# Patient Record
Sex: FEMALE | Race: WHITE | NOT HISPANIC OR LATINO | Employment: OTHER | URBAN - METROPOLITAN AREA
[De-identification: names, ages, dates, MRNs, and addresses within clinical notes are randomized per-mention and may not be internally consistent; named-entity substitution may affect disease eponyms.]

---

## 2022-09-13 ENCOUNTER — HOSPITAL ENCOUNTER (EMERGENCY)
Facility: CLINIC | Age: 67
Discharge: HOME | End: 2022-09-14
Attending: STUDENT IN AN ORGANIZED HEALTH CARE EDUCATION/TRAINING PROGRAM | Admitting: STUDENT IN AN ORGANIZED HEALTH CARE EDUCATION/TRAINING PROGRAM
Payer: COMMERCIAL

## 2022-09-13 ENCOUNTER — APPOINTMENT (OUTPATIENT)
Dept: MRI IMAGING | Facility: CLINIC | Age: 67
End: 2022-09-13
Attending: STUDENT IN AN ORGANIZED HEALTH CARE EDUCATION/TRAINING PROGRAM
Payer: COMMERCIAL

## 2022-09-13 DIAGNOSIS — G45.4 TGA (TRANSIENT GLOBAL AMNESIA): ICD-10-CM

## 2022-09-13 LAB
ALBUMIN SERPL-MCNC: 3.7 G/DL (ref 3.5–5)
ALBUMIN UR-MCNC: NEGATIVE MG/DL
ALP SERPL-CCNC: 51 U/L (ref 45–120)
ALT SERPL W P-5'-P-CCNC: 28 U/L (ref 0–45)
AMPHETAMINES UR QL SCN: NORMAL
ANION GAP SERPL CALCULATED.3IONS-SCNC: 11 MMOL/L (ref 5–18)
APPEARANCE UR: CLEAR
AST SERPL W P-5'-P-CCNC: 24 U/L (ref 0–40)
BARBITURATES UR QL: NORMAL
BASOPHILS # BLD AUTO: 0 10E3/UL (ref 0–0.2)
BASOPHILS NFR BLD AUTO: 0 %
BENZODIAZ UR QL: NORMAL
BILIRUB SERPL-MCNC: 0.3 MG/DL (ref 0–1)
BILIRUB UR QL STRIP: NEGATIVE
BUN SERPL-MCNC: 12 MG/DL (ref 8–22)
CALCIUM SERPL-MCNC: 9.2 MG/DL (ref 8.5–10.5)
CANNABINOIDS UR QL SCN: NORMAL
CHLORIDE BLD-SCNC: 103 MMOL/L (ref 98–107)
CO2 SERPL-SCNC: 26 MMOL/L (ref 22–31)
COCAINE UR QL: NORMAL
COLOR UR AUTO: COLORLESS
CREAT SERPL-MCNC: 0.74 MG/DL (ref 0.6–1.1)
CREAT UR-MCNC: 32 MG/DL
EOSINOPHIL # BLD AUTO: 0 10E3/UL (ref 0–0.7)
EOSINOPHIL NFR BLD AUTO: 1 %
ERYTHROCYTE [DISTWIDTH] IN BLOOD BY AUTOMATED COUNT: 12.8 % (ref 10–15)
ETHANOL SERPL-MCNC: <10 MG/DL
GFR SERPL CREATININE-BSD FRML MDRD: 88 ML/MIN/1.73M2
GLUCOSE BLD-MCNC: 91 MG/DL (ref 70–125)
GLUCOSE UR STRIP-MCNC: NEGATIVE MG/DL
HCT VFR BLD AUTO: 36.4 % (ref 35–47)
HGB BLD-MCNC: 12.5 G/DL (ref 11.7–15.7)
HGB UR QL STRIP: NEGATIVE
IMM GRANULOCYTES # BLD: 0 10E3/UL
IMM GRANULOCYTES NFR BLD: 0 %
KETONES UR STRIP-MCNC: NEGATIVE MG/DL
LEUKOCYTE ESTERASE UR QL STRIP: NEGATIVE
LYMPHOCYTES # BLD AUTO: 2.1 10E3/UL (ref 0.8–5.3)
LYMPHOCYTES NFR BLD AUTO: 33 %
MCH RBC QN AUTO: 31.2 PG (ref 26.5–33)
MCHC RBC AUTO-ENTMCNC: 34.3 G/DL (ref 31.5–36.5)
MCV RBC AUTO: 91 FL (ref 78–100)
METHADONE UR QL SCN: NORMAL
MONOCYTES # BLD AUTO: 0.5 10E3/UL (ref 0–1.3)
MONOCYTES NFR BLD AUTO: 8 %
NEUTROPHILS # BLD AUTO: 3.8 10E3/UL (ref 1.6–8.3)
NEUTROPHILS NFR BLD AUTO: 58 %
NITRATE UR QL: NEGATIVE
NRBC # BLD AUTO: 0 10E3/UL
NRBC BLD AUTO-RTO: 0 /100
OPIATES UR QL SCN: NORMAL
OXYCODONE UR QL: NORMAL
PCP UR QL SCN: NORMAL
PH UR STRIP: 7 [PH] (ref 5–7)
PLATELET # BLD AUTO: 285 10E3/UL (ref 150–450)
POTASSIUM BLD-SCNC: 4.1 MMOL/L (ref 3.5–5)
PROT SERPL-MCNC: 6.7 G/DL (ref 6–8)
RBC # BLD AUTO: 4.01 10E6/UL (ref 3.8–5.2)
RBC URINE: 0 /HPF
SODIUM SERPL-SCNC: 140 MMOL/L (ref 136–145)
SP GR UR STRIP: 1.01 (ref 1–1.03)
SQUAMOUS EPITHELIAL: <1 /HPF
UROBILINOGEN UR STRIP-MCNC: <2 MG/DL
WBC # BLD AUTO: 6.5 10E3/UL (ref 4–11)
WBC URINE: <1 /HPF

## 2022-09-13 PROCEDURE — 250N000013 HC RX MED GY IP 250 OP 250 PS 637: Performed by: STUDENT IN AN ORGANIZED HEALTH CARE EDUCATION/TRAINING PROGRAM

## 2022-09-13 PROCEDURE — 85004 AUTOMATED DIFF WBC COUNT: CPT | Performed by: PHYSICIAN ASSISTANT

## 2022-09-13 PROCEDURE — 80307 DRUG TEST PRSMV CHEM ANLYZR: CPT | Performed by: PHYSICIAN ASSISTANT

## 2022-09-13 PROCEDURE — 81001 URINALYSIS AUTO W/SCOPE: CPT | Mod: XS | Performed by: PHYSICIAN ASSISTANT

## 2022-09-13 PROCEDURE — 70553 MRI BRAIN STEM W/O & W/DYE: CPT

## 2022-09-13 PROCEDURE — 82077 ASSAY SPEC XCP UR&BREATH IA: CPT | Performed by: PHYSICIAN ASSISTANT

## 2022-09-13 PROCEDURE — 36415 COLL VENOUS BLD VENIPUNCTURE: CPT | Performed by: PHYSICIAN ASSISTANT

## 2022-09-13 PROCEDURE — 80053 COMPREHEN METABOLIC PANEL: CPT | Performed by: PHYSICIAN ASSISTANT

## 2022-09-13 PROCEDURE — 99285 EMERGENCY DEPT VISIT HI MDM: CPT | Mod: 25

## 2022-09-13 RX ORDER — ACETAMINOPHEN 325 MG/1
975 TABLET ORAL ONCE
Status: COMPLETED | OUTPATIENT
Start: 2022-09-13 | End: 2022-09-13

## 2022-09-13 RX ADMIN — ACETAMINOPHEN 975 MG: 325 TABLET, FILM COATED ORAL at 20:12

## 2022-09-13 ASSESSMENT — ACTIVITIES OF DAILY LIVING (ADL)
ADLS_ACUITY_SCORE: 35

## 2022-09-13 NOTE — ED TRIAGE NOTES
"Pt presents to the ED with c/o memory loss today around  this morning. Pt does not recall any events and some how \"ended up at work\". Since sx resolved and pt is alert and orientated x4. Pt hx of head bleed. Pt currently having HA. Provider in triage - no stroke code at this time.       "

## 2022-09-13 NOTE — PROGRESS NOTES
Called by ED about this patient with a episode of loss of time for 4 hours today.  Now back to normal.  No witness during time but she was able to drive.  Normal neuro exam per report.      Without witness when symptomatic it is tough to say defintively but if was able to drive suspect was TGA.   Since cannot say more definitively would get MRI brain, however do not think needs admission unless MRI abnormal if normal exam.  Would also consider drug screen.  Would consider neurology referral for EEG but this is non urgent and can be done on outpatient basis if at baseline.      Liberty franco, DO

## 2022-09-13 NOTE — ED PROVIDER NOTES
Emergency Department Encounter         FINAL IMPRESSION:  TGA        ED COURSE AND MEDICAL DECISION MAKING       ED Course as of 09/13/22 2145   Tue Sep 13, 2022   [id number] Patient is a 67-year-old female with a history of orthopedic pain, no other chronic medical problems, here from home with a transient episode of loss of memory.  States that she woke up this morning feeling good.  Worked out without difficulty.  When she came back from working out there now is a blank space of memory from about 8 AM to about noon.  States that she went to work at some point and does not know how she got there.  Family stated that they found her purse in the road and they think that she most likely put her purse on top of her car and started driving.  Patient on arrival here is neurologically intact.  Is alert and oriented x4.  NIH of 0.   states patient has been doing well.  Does not seem confused.  Suspicion for TGA.  Physical examination otherwise unremarkable.  Plan for basic labs MRI of the brain with and without contrast and discussion with neurology.   2008 Discussed case with on-call neurology who agrees that patient's presentation is consistent with a TGA episode.  Plan for MRI, basic labs and if normal discharge home with close outpatient follow-up with neurology for most likely outpatient EEG.     -Patient signed out to oncoming physician pending MRI.  If normal, discharged home with close outpatient follow-up.    6:15 PM I met with the patient to gather history and to perform my initial exam. I discussed the plan for care while in the Emergency Department.   6:56 PM Spoke with neurology, Dr. Rivers. They agree with the plan, and recommend non emergent outpatient EEG.  7:16 PM Checked in on and updated patient.     At the conclusion of the encounter I discussed the results of all the tests and the disposition. The questions were answered. The patient or family acknowledged understanding and was agreeable with  "the care plan.          MEDICATIONS GIVEN IN THE EMERGENCY DEPARTMENT:  Medications - No data to display    NEW PRESCRIPTIONS STARTED AT TODAY'S ED VISIT:  New Prescriptions    No medications on file       HPI     Patient information obtained from: Patient    Use of Interpretor: N/A     Salome Isaacs is a 67 year old female with no pertinent history on file who presents to this ED by walk-in for evaluation of memory loss.     Patient states she woke up this morning feeling great which was unusual for her due to many medical problems. She went to workout at 6:00 AM, and finished around 8:00 AM. The next thing patient remembered was being at work, and not knowing how she got there. This was around 12:00 PM. Patient denies knowing what happened to her during her memory loss. Patient denies any new pain, difficulty walking, or any other complaints at this time. Patient's  states they found her purse in the road, and assume she placed it on the car and drove off. He also states her son called her and thought she sounded \"out of sorts\". Endorses she is currently back to normal and is not currently confused. He notes she has had brain surgery twice.     REVIEW OF SYSTEMS:  Review of Systems   Constitutional: Negative for fever, malaise  HEENT: Negative runny nose, sore throat, ear pain, neck pain  Respiratory: Negative for shortness of breath, cough, congestion  Cardiovascular: Negative for chest pain, leg edema  Gastrointestinal: Negative for abdominal distention, abdominal pain, constipation, vomiting, nausea, diarrhea  Genitourinary: Negative for dysuria and hematuria.   Integument: Negative for rash, skin breakdown  Neurological: Negative for paresthesias, weakness, headache. Positive for memory loss.   Musculoskeletal: Negative for joint pain, joint swelling      All other systems reviewed and are negative.          MEDICAL HISTORY     No past medical history on file.    No past surgical history on " "file.         No current outpatient medications on file.          PHYSICAL EXAM     BP (!) 147/85   Pulse 102   Temp 98.5  F (36.9  C) (Oral)   Resp 18   Ht 1.626 m (5' 4\")   Wt 61.2 kg (135 lb)   SpO2 98%   BMI 23.17 kg/m        PHYSICAL EXAM:     General: Patient appears well, nontoxic, comfortable  HEENT: Moist mucous membranes, no tongue swelling.  No head trauma.  No midline neck pain.  Cardiovascular: Normal rate, normal rhythm, no extremity edema.  No appreciable murmur.  Respiratory: No signs of respiratory distress, lungs are clear to auscultation bilaterally with no wheezes rhonchi or rales.  Abdominal: Soft, nontender, nondistended, no palpable masses, no guarding, no rebound  Musculoskeletal: Full range of motion of joints, no deformities appreciated.  Neurological: Alert and oriented x4, grossly neurologically intact.  [employer] Stroke Scale  Exam Interval: Baseline   Score    Level of consciousness: (0)   Alert, keenly responsive    LOC questions: (0)   Answers both questions correctly    LOC commands: (0)   Performs both tasks correctly    Best gaze: (0)   Normal    Visual: (0)   No visual loss    Facial palsy: (0)   Normal symmetrical movements    Motor arm (left): (0)   No drift    Motor arm (right): (0)   No drift    Motor leg (left): (0)   No drift    Motor leg (right): (0)   No drift    Limb ataxia: (0)   Absent    Sensory: (0)   Normal- no sensory loss    Best language: (0)   Normal- no aphasia    Dysarthria: (0)   Normal    Extinction and inattention: (0)   No abnormality        Total Score:  0   Psychological: Normal affect and mood.  Integument: No rashes appreciated      RESULTS       Labs Ordered and Resulted from Time of ED Arrival to Time of ED Departure   ROUTINE UA WITH MICROSCOPIC REFLEX TO CULTURE - Normal       Result Value    Color Urine Colorless      Appearance Urine Clear      Glucose Urine Negative      Bilirubin Urine Negative      Ketones Urine " Negative      Specific Gravity Urine 1.007      Blood Urine Negative      pH Urine 7.0      Protein Albumin Urine Negative      Urobilinogen Urine <2.0      Nitrite Urine Negative      Leukocyte Esterase Urine Negative      RBC Urine 0      WBC Urine <1      Squamous Epithelials Urine <1     ETHYL ALCOHOL LEVEL - Normal    Alcohol, Blood <10     COMPREHENSIVE METABOLIC PANEL - Normal    Sodium 140      Potassium 4.1      Chloride 103      Carbon Dioxide (CO2) 26      Anion Gap 11      Urea Nitrogen 12      Creatinine 0.74      Calcium 9.2      Glucose 91      Alkaline Phosphatase 51      AST 24      ALT 28      Protein Total 6.7      Albumin 3.7      Bilirubin Total 0.3      GFR Estimate 88     CBC WITH PLATELETS AND DIFFERENTIAL    WBC Count 6.5      RBC Count 4.01      Hemoglobin 12.5      Hematocrit 36.4      MCV 91      MCH 31.2      MCHC 34.3      RDW 12.8      Platelet Count 285      % Neutrophils 58      % Lymphocytes 33      % Monocytes 8      % Eosinophils 1      % Basophils 0      % Immature Granulocytes 0      NRBCs per 100 WBC 0      Absolute Neutrophils 3.8      Absolute Lymphocytes 2.1      Absolute Monocytes 0.5      Absolute Eosinophils 0.0      Absolute Basophils 0.0      Absolute Immature Granulocytes 0.0      Absolute NRBCs 0.0     DRUGS OF ABUSE 1+ PANEL, URINE ([facility] ONLY)    Amphetamines Urine Screen Negative      Benzodiazepines Urine Screen Negative      Opiates Urine Screen Negative      PCP Urine Screen Negative      Cannabinoids Urine Screen Negative      Barbiturates Urine Screen Negative      Cocaine Urine Screen Negative      Methadone Urine Screen Negative      Oxycodone Urine Screen Negative      Creatinine Urine mg/dL 32         MR Brain w/o & w Contrast    (Results Pending)             PROCEDURES:  Procedures:  Procedures       I, Libra Badillo am serving as a scribe to document services personally performed by Luis Eduardo Orozco DO, based on my observations and the provider's statements  to me.  I, Luis Eduardo Orozco DO, attest that Libra Badillo is acting in a scribe capacity, has observed my performance of the services and has documented them in accordance with my direction.    Luis Eduardo Orozco DO  Emergency Medicine  [facility] EMERGENCY ROOM     Ernesto, Luis Eduardo Diamond DO  09/13/22 5786

## 2022-09-14 VITALS
SYSTOLIC BLOOD PRESSURE: 159 MMHG | HEIGHT: 64 IN | HEART RATE: 77 BPM | DIASTOLIC BLOOD PRESSURE: 71 MMHG | RESPIRATION RATE: 18 BRPM | OXYGEN SATURATION: 96 % | TEMPERATURE: 98.5 F | BODY MASS INDEX: 23.05 KG/M2 | WEIGHT: 135 LBS

## 2022-09-14 PROCEDURE — A9585 GADOBUTROL INJECTION: HCPCS | Performed by: EMERGENCY MEDICINE

## 2022-09-14 PROCEDURE — 255N000002 HC RX 255 OP 636: Performed by: EMERGENCY MEDICINE

## 2022-09-14 RX ORDER — GADOBUTROL 604.72 MG/ML
6 INJECTION INTRAVENOUS ONCE
Status: COMPLETED | OUTPATIENT
Start: 2022-09-14 | End: 2022-09-14

## 2022-09-14 RX ADMIN — GADOBUTROL 6 ML: 604.72 INJECTION INTRAVENOUS at 00:25

## 2022-09-14 ASSESSMENT — ACTIVITIES OF DAILY LIVING (ADL): ADLS_ACUITY_SCORE: 35
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
## 2022-09-14 NOTE — ED NOTES
MRI has been completed and read, and final eval done by MD. Hinton for discharge per Dr. Maldonado

## 2022-09-14 NOTE — DISCHARGE INSTRUCTIONS
Your MRI today was normal.  There were no signs of stroke or tumor.    Which you most likely had with something called transient global amnesia.  This is a rare phenomenon where you have retrograde amnesia about certain events.  It can last anywhere from 4 to 24 hours.    Please continue take your home medications.  Follow-up with neurology.  You will need most likely an outpatient EEG to make sure you do not have any seizure activity.  Continue to exercise.  Follow-up with primary care doctor in the next week or so.

## 2022-11-11 ENCOUNTER — HOSPITAL ENCOUNTER (EMERGENCY)
Facility: CLINIC | Age: 67
Discharge: HOME | End: 2022-11-12
Admitting: PHYSICIAN ASSISTANT
Payer: COMMERCIAL

## 2022-11-11 ENCOUNTER — APPOINTMENT (OUTPATIENT)
Dept: CT IMAGING | Facility: CLINIC | Age: 67
End: 2022-11-11
Attending: PHYSICIAN ASSISTANT
Payer: COMMERCIAL

## 2022-11-11 VITALS
OXYGEN SATURATION: 98 % | SYSTOLIC BLOOD PRESSURE: 134 MMHG | HEIGHT: 64 IN | HEART RATE: 89 BPM | RESPIRATION RATE: 16 BRPM | DIASTOLIC BLOOD PRESSURE: 61 MMHG | BODY MASS INDEX: 22.88 KG/M2 | TEMPERATURE: 98 F | WEIGHT: 134 LBS

## 2022-11-11 DIAGNOSIS — S13.4XXA WHIPLASH INJURY TO NECK, INITIAL ENCOUNTER: ICD-10-CM

## 2022-11-11 PROCEDURE — 70450 CT HEAD/BRAIN W/O DYE: CPT

## 2022-11-11 PROCEDURE — 99284 EMERGENCY DEPT VISIT MOD MDM: CPT | Mod: 25

## 2022-11-11 RX ORDER — ACETAMINOPHEN 325 MG/1
975 TABLET ORAL ONCE
Status: DISCONTINUED | OUTPATIENT
Start: 2022-11-11 | End: 2022-11-12 | Stop reason: HOSPADM

## 2022-11-11 ASSESSMENT — ENCOUNTER SYMPTOMS: HEADACHES: 1

## 2022-11-11 ASSESSMENT — ACTIVITIES OF DAILY LIVING (ADL)
ADLS_ACUITY_SCORE: 33
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
## 2023-10-03 ENCOUNTER — APPOINTMENT (OUTPATIENT)
Dept: ULTRASOUND IMAGING | Facility: CLINIC | Age: 68
End: 2023-10-03
Attending: EMERGENCY MEDICINE
Payer: COMMERCIAL

## 2023-10-03 ENCOUNTER — APPOINTMENT (OUTPATIENT)
Dept: RADIOLOGY | Facility: CLINIC | Age: 68
End: 2023-10-03
Attending: EMERGENCY MEDICINE
Payer: COMMERCIAL

## 2023-10-03 ENCOUNTER — HOSPITAL ENCOUNTER (EMERGENCY)
Facility: CLINIC | Age: 68
Discharge: HOME | End: 2023-10-03
Attending: EMERGENCY MEDICINE | Admitting: EMERGENCY MEDICINE
Payer: COMMERCIAL

## 2023-10-03 VITALS
TEMPERATURE: 97.4 F | SYSTOLIC BLOOD PRESSURE: 130 MMHG | DIASTOLIC BLOOD PRESSURE: 63 MMHG | RESPIRATION RATE: 30 BRPM | HEART RATE: 85 BPM | OXYGEN SATURATION: 98 % | BODY MASS INDEX: 23 KG/M2 | WEIGHT: 134 LBS

## 2023-10-03 DIAGNOSIS — R07.89 ATYPICAL CHEST PAIN: ICD-10-CM

## 2023-10-03 DIAGNOSIS — M25.00 HEMARTHROSIS: ICD-10-CM

## 2023-10-03 LAB
ANION GAP SERPL CALCULATED.3IONS-SCNC: 12 MMOL/L (ref 7–15)
APPEARANCE FLD: ABNORMAL
ATRIAL RATE - MUSE: 108 BPM
BUN SERPL-MCNC: 20.8 MG/DL (ref 8–23)
CALCIUM SERPL-MCNC: 9 MG/DL (ref 8.8–10.2)
CELL COUNT BODY FLUID SOURCE: ABNORMAL
CHLORIDE SERPL-SCNC: 97 MMOL/L (ref 98–107)
COLOR FLD: ABNORMAL
CREAT SERPL-MCNC: 0.86 MG/DL (ref 0.51–0.95)
CRYSTALS SNV MICRO: NORMAL
DEPRECATED HCO3 PLAS-SCNC: 23 MMOL/L (ref 22–29)
DIASTOLIC BLOOD PRESSURE - MUSE: NORMAL MMHG
EGFRCR SERPLBLD CKD-EPI 2021: 73 ML/MIN/1.73M2
ERYTHROCYTE [DISTWIDTH] IN BLOOD BY AUTOMATED COUNT: 13.2 % (ref 10–15)
GLUCOSE SERPL-MCNC: 106 MG/DL (ref 70–99)
GRAM STAIN RESULT: NORMAL
GRAM STAIN RESULT: NORMAL
HCT VFR BLD AUTO: 34.1 % (ref 35–47)
HGB BLD-MCNC: 12 G/DL (ref 11.7–15.7)
INTERPRETATION ECG - MUSE: NORMAL
LYMPHOCYTES NFR FLD MANUAL: 15 %
MCH RBC QN AUTO: 31.9 PG (ref 26.5–33)
MCHC RBC AUTO-ENTMCNC: 35.2 G/DL (ref 31.5–36.5)
MCV RBC AUTO: 91 FL (ref 78–100)
MONOS+MACROS NFR FLD MANUAL: 76 %
NEUTS BAND NFR FLD MANUAL: 9 %
NT-PROBNP SERPL-MCNC: 44 PG/ML (ref 0–900)
P AXIS - MUSE: -11 DEGREES
PLATELET # BLD AUTO: 186 10E3/UL (ref 150–450)
POTASSIUM SERPL-SCNC: 3.6 MMOL/L (ref 3.4–5.3)
PR INTERVAL - MUSE: 130 MS
QRS DURATION - MUSE: 90 MS
QT - MUSE: 318 MS
QTC - MUSE: 426 MS
R AXIS - MUSE: 10 DEGREES
RBC # BLD AUTO: 3.76 10E6/UL (ref 3.8–5.2)
SODIUM SERPL-SCNC: 132 MMOL/L (ref 135–145)
SYSTOLIC BLOOD PRESSURE - MUSE: NORMAL MMHG
T AXIS - MUSE: 49 DEGREES
TROPONIN T SERPL HS-MCNC: 10 NG/L
VENTRICULAR RATE- MUSE: 108 BPM
WBC # BLD AUTO: 7.4 10E3/UL (ref 4–11)
WBC # FLD AUTO: 1143 /UL

## 2023-10-03 PROCEDURE — 73562 X-RAY EXAM OF KNEE 3: CPT | Mod: LT

## 2023-10-03 PROCEDURE — 85027 COMPLETE CBC AUTOMATED: CPT | Performed by: STUDENT IN AN ORGANIZED HEALTH CARE EDUCATION/TRAINING PROGRAM

## 2023-10-03 PROCEDURE — 93005 ELECTROCARDIOGRAM TRACING: CPT | Performed by: STUDENT IN AN ORGANIZED HEALTH CARE EDUCATION/TRAINING PROGRAM

## 2023-10-03 PROCEDURE — 84484 ASSAY OF TROPONIN QUANT: CPT | Performed by: STUDENT IN AN ORGANIZED HEALTH CARE EDUCATION/TRAINING PROGRAM

## 2023-10-03 PROCEDURE — 89051 BODY FLUID CELL COUNT: CPT | Performed by: STUDENT IN AN ORGANIZED HEALTH CARE EDUCATION/TRAINING PROGRAM

## 2023-10-03 PROCEDURE — 87205 SMEAR GRAM STAIN: CPT | Performed by: STUDENT IN AN ORGANIZED HEALTH CARE EDUCATION/TRAINING PROGRAM

## 2023-10-03 PROCEDURE — 93971 EXTREMITY STUDY: CPT | Mod: LT

## 2023-10-03 PROCEDURE — 87070 CULTURE OTHR SPECIMN AEROBIC: CPT | Performed by: STUDENT IN AN ORGANIZED HEALTH CARE EDUCATION/TRAINING PROGRAM

## 2023-10-03 PROCEDURE — 89060 EXAM SYNOVIAL FLUID CRYSTALS: CPT | Performed by: STUDENT IN AN ORGANIZED HEALTH CARE EDUCATION/TRAINING PROGRAM

## 2023-10-03 PROCEDURE — 99285 EMERGENCY DEPT VISIT HI MDM: CPT | Mod: 25

## 2023-10-03 PROCEDURE — 83880 ASSAY OF NATRIURETIC PEPTIDE: CPT | Performed by: STUDENT IN AN ORGANIZED HEALTH CARE EDUCATION/TRAINING PROGRAM

## 2023-10-03 PROCEDURE — 80048 BASIC METABOLIC PNL TOTAL CA: CPT | Performed by: STUDENT IN AN ORGANIZED HEALTH CARE EDUCATION/TRAINING PROGRAM

## 2023-10-03 PROCEDURE — 36415 COLL VENOUS BLD VENIPUNCTURE: CPT | Performed by: STUDENT IN AN ORGANIZED HEALTH CARE EDUCATION/TRAINING PROGRAM

## 2023-10-03 PROCEDURE — 20610 DRAIN/INJ JOINT/BURSA W/O US: CPT | Mod: LT

## 2023-10-03 ASSESSMENT — ACTIVITIES OF DAILY LIVING (ADL): ADLS_ACUITY_SCORE: 35

## 2023-10-03 NOTE — ED TRIAGE NOTES
Pt arrives to ED with c/o left sided hip pain that radiates down into her left knee since this morning. Pt endorses to having other episodes of the same pain. Pt states her knee is swollen as well. Pt states on 9/8 she got an injection in the left knee and within an hour pt felt pain since then pt has had intermittent pain in that knee. Pt having difficulty walking due to pain and stability on left side.      Triage Assessment       Row Name 10/03/23 7307       Triage Assessment (Adult)    Airway WDL WDL       Respiratory WDL    Respiratory WDL WDL       Skin Circulation/Temperature WDL    Skin Circulation/Temperature WDL WDL       Cardiac WDL    Cardiac WDL WDL       Peripheral/Neurovascular WDL    Peripheral Neurovascular WDL WDL       Cognitive/Neuro/Behavioral WDL    Cognitive/Neuro/Behavioral WDL WDL

## 2023-10-04 NOTE — DISCHARGE INSTRUCTIONS
You were seen in the emergency department today for swelling of your left knee joint. This was drained and found a significant hemarthrosis (blood in the joint). This was sent for culture and you will be called if you need to start antibiotics. Please follow-up with [facility] for further evaluation and management of your left knee pain. Ice and elevate the knee to help with swelling. You may take Ibuprofen up to 400 mg by mouth every 4-6 hours as needed for pain. Do not exceed 2400 mg/day.  You may take Tylenol 325-1000 mg by mouth every 4-6 hours as needed for pain. Do not exceed 1000mg (1g) in 4 hours or 4 g/day from all sources.  You may combine Tylenol and Ibuprofen- up to 400 mg of ibuprofen and 1000 mg of Tylenol at the same time, up to 3 times a day for the pain    Additionally, you develop chest pain while in the emergency department and this was evaluated.  Your EKG was normal and your lab work was unremarkable.  It is possible this was anxiety or acid reflux.  Please continue to take your omeprazole and follow-up with your primary care provider for further evaluation of your chest pain.    Please return to the emergency department if you experience sudden severe pain in the knee, shortness of breath, worsened chest pain.

## 2023-10-04 NOTE — ED PROVIDER NOTES
Emergency Department Encounter   NAME: Salome Isaacs ; AGE: 68 year old female ; YOB: 1955 ; MRN: [id number] ; PCP: Clinic, [facility]   ED PROVIDER: Jeanette Ball PA-C    Evaluation Date & Time:   No admission date for patient encounter.    CHIEF COMPLAINT:  Joint Swelling and Hip Pain      Impression and Plan   MDM:   Salome Isaacs is a 68 year old female with PMH of myalgia, lumbar degenerative disc disease, and osteoarthritis of the knees presenting for left knee pain and swelling that has been present since receiving a corticosteroid injection on 9/8.  She states experiencing sharp pains in her knee upon waking up this morning and is unable to bear weight due to pain. She rates the pain at 10/10. She has been taking Toradol for pain with minimal improvement as of recent.  While on her way back to the department from the waiting room she alerted staff that she has been getting on and off chest pains for the past month or so. She states the pain is at its worst when she lays down at night to go to bed. She notes more anxiety over the past month or so since her knee has started to be bothersome.  She has a history of GERD and is currently on omeprazole.    Vitals reviewed and unremarkable. On exam she is resting comfortably in a wheelchair, unable to ambulate due to severe pain. The left knee is edematous, warm, and tender to the touch over the medial joint line. Overlying skin is not erythematous.    Differential diagnosis includes but not limited to bursitis, osteoarthritis, muscle strain, septic joint, DVT. Left lower extremity venous ultrasound found no evidence of DVT or superficial thrombophlebitis. This did find a moderate-sized left knee effusion. XR left knee found no acute displaced left knee fracture or dislocation, this did find mild lateral left patellar subluxation with severe patellofemoral knee osteoarthritis that is bone-on-bone.  And her severe pain  starting today as well as moderate effusion a joint arthrocentesis was performed. About 50 ccs of blood was aspirated from the joint, she had improvement in her symptoms following. The fluid was sent for analysis and culture. As the fluid that was aspirated was very bloody I expect the synovial fluid analysis will likely be difficult to interpret, however, I am hoping to at least see if the culture grows any bacteria. I have low suspicion for septic joint as she is able to range the joint there is no overlying erythema. I educated the patient that this pain relief is likely temporary and she will need to follow-up with orthopedics for further management.  I have provided her the information for [facility] and she will call first thing tomorrow morning to schedule follow-up. She can take Tylenol and ibuprofen as needed for pain relief.    Regarding her chest pain, her EKG found no evidence of arrhythmia or ST changes and her HS troponin today is 10, which is within the normal range for her gender. I did not feel this needed to be repeated as she states this chest pain has been occurring on and off for the past several months. I have very low suspicion for pulmonary embolism as her vitals are stable and she states having these chest pain episodes on and off for the past few months with complete resolution of symptoms between episodes. CHF is unlikely as she had a BNP within normal range and does not appear fluid overloaded. I feel her intermittent chest pain likely has an anxiety component. She also has a history of GERD and currently takes omeprazole so it may be related to acid reflux. She has an appointment scheduled with cardiology on Monday where they may be able to further evaluate her atypical chest pain. I also recommended follow-up with her primary care provider for further evaluation of this. Her lab work is otherwise unremarkable aside from a mild hyponatremia which can be corrected with diet.  Educated the patient on reasons to return to the emergency department, patient is understanding of this.      Medical Decision Making    History:  Supplemental history from: Documented in chart, if applicable  External Record(s) reviewed: 9/18/23 ED visit  Work Up:  Chart documentation includes differential considered and any EKGs or imaging independently interpreted by provider, where specified.  In additional to work up documented, I considered the following work up: CT PE    External consultation:  Discussion of management with another provider: Dr. Barnes  Complicating factors:  Care impacted by chronic illness: Chronic Pain  Care affected by social determinants of health: N/A    Disposition considerations: Discharge. No recommendations on prescription strength medication(s). See documentation for any additional details.      ED COURSE:  7:22 PM I met and introduced myself to the patient. I gathered initial history and performed my physical exam. We discussed plan for initial workup.   7:45 PM Patient developed chest pain on her way back to the department. I have staffed the patient with Dr. Barnes, ED MD, who has evaluated the patient and agrees with all aspects of today's care.   8:50 PM the joint was aspirated, about 50 ml of bloody fluid was removed from the left knee.  9:34 PM I rechecked the patient and discussed results, discharge, follow up, and reasons to return to the ED.     At the conclusion of the encounter I discussed the results of all the tests and the disposition. The questions were answered. The patient or family acknowledged understanding and was agreeable with the care plan.    FINAL IMPRESSION:    ICD-10-CM    1. Hemarthrosis  M25.00       2. Atypical chest pain  R07.89             MEDICATIONS GIVEN IN THE EMERGENCY DEPARTMENT:  Medications - No data to display      NEW PRESCRIPTIONS STARTED AT TODAY'S ED VISIT:  There are no discharge medications for this patient.        HPI   Patient  information was obtained from: Patient  Use of Intrepreter: N/A     Salome Isaacs is a 68 year old female with PMH of myalgia, lumbar degenerative disc disease, and osteoarthritis of the knees presenting for left knee pain and swelling that has been present since receiving a corticosteroid injection on 9/8.  She states experiencing sharp pains in her knee upon waking up this morning and is unable to bear weight due to pain. She rates the pain at 10/10. She has been taking Toradol for pain with minimal improvement as of recent.  While on her way back to the department from the waiting room she alerted staff that she has been getting on and off chest pains for the past month or so. She states the pain is at its worst when she lays down at night to go to bed. She notes more anxiety over the past month or so since her knee has started to be bothersome.  She has a history of GERD and is currently on omeprazole.      REVIEW OF SYSTEMS:  Pertinent positive and negative symptoms per HPI.       Medical History     No past medical history on file.    No past surgical history on file.    No family history on file.         No current outpatient medications on file.        Physical Exam     First Vitals:  Patient Vitals for the past 24 hrs:   BP Temp Temp src Pulse Resp SpO2 Weight   10/03/23 2145 130/63 -- -- 85 30 98 % --   10/03/23 2130 124/61 -- -- 87 30 100 % --   10/03/23 2000 (!) 155/68 -- -- 106 -- 100 % --   10/03/23 1650 107/56 97.4  F (36.3  C) Temporal 100 18 100 % 60.8 kg (134 lb)         PHYSICAL EXAM:   Physical Exam  Constitutional:       General: She is not in acute distress.     Appearance: Normal appearance.   Cardiovascular:      Rate and Rhythm: Normal rate and regular rhythm.   Pulmonary:      Effort: Pulmonary effort is normal.      Breath sounds: Normal breath sounds.   Musculoskeletal:         General: Swelling and tenderness present.      Comments: The left knee appears edematous with some  overlying warmth. No overlying erythema. The left knee is tender to palpation, particularly over the medial joint line. No pain in the posterior knee or calf. She has almost full ROM of the left, although it is quite painful. Left knee extension 4/5. Left knee flexion 5/5. Left ankle ROM and strength full.   Skin:     General: Skin is warm and dry.      Capillary Refill: Capillary refill takes less than 2 seconds.      Findings: No bruising or erythema.   Neurological:      Mental Status: She is alert.         Results     LAB:  All pertinent labs reviewed and interpreted  Labs Ordered and Resulted from Time of ED Arrival to Time of ED Departure   BASIC METABOLIC PANEL - Abnormal       Result Value    Sodium 132 (*)     Potassium 3.6      Chloride 97 (*)     Carbon Dioxide (CO2) 23      Anion Gap 12      Urea Nitrogen 20.8      Creatinine 0.86      GFR Estimate 73      Calcium 9.0      Glucose 106 (*)    CBC WITH PLATELETS - Abnormal    WBC Count 7.4      RBC Count 3.76 (*)     Hemoglobin 12.0      Hematocrit 34.1 (*)     MCV 91      MCH 31.9      MCHC 35.2      RDW 13.2      Platelet Count 186     CELL COUNT BODY FLUID - Abnormal    Color Brown (*)     Clarity Turbid (*)     Cell Count Fluid Source Knee, Left      Total Nucleated Cells 1,143     TROPONIN T, HIGH SENSITIVITY - Normal    Troponin T, High Sensitivity 10     N TERMINAL PRO BNP OUTPATIENT - Normal    N Terminal Pro BNP Outpatient 44     CRYSTAL ID SYNOVIAL FLUID - Normal    Crystals Analysis No clinically significant crystals seen.     GRAM STAIN   AEROBIC BACTERIAL CULTURE ROUTINE       RADIOLOGY:  XR Knee Left 3 Views   Final Result   IMPRESSION: No acute displaced left knee fracture or dislocation is identified. Mild lateral left patellar subluxation with severe patellofemoral compartment knee osteoarthritis with bone-on-bone abutment laterally and fragmented chronic ossicle along    the lateral patella. Joint space narrowing and marginal spurring in  the medial and lateral compartments, particularly the lateral compartment. Couple of loose bodies are present particularly in the posterolateral left knee. Sizable left knee joint    effusion with likely loose bodies in the suprapatellar left knee. Anterior knee soft tissue swelling is mild.      US Lower Extremity Venous Duplex Left   Final Result   IMPRESSION:   1.  No deep venous thrombosis in the left lower extremity.   2.  Moderate-sized left knee effusion measuring 8.8 x 6.2 x 3.2 cm.            ECG:  Performed at: 19:45    Impression: sinus tachycardia    Rate: 108  Rhythm: sinus  Axis: normal  AL Interval: 130  QRS Interval: 90  QTc Interval: 426  ST Changes: no ST changes  Comparison: no previous EKGs for comparison    EKG results reviewed and interpreted by Dr. Cameron ED MD.         PROCEDURE: Arthrocentesis   INDICATIONS:  Left knee joint effusion   PROCEDURE PROVIDER: Dr. Quinton Ball PA-C   CONSENT: Risks, benefits and alternatives were discussed with and Verbal consent was obtained from Patient.   TIME OUT: Universal protocol was followed. TIME OUT conducted just prior to starting procedure confirmed patient identity, site/side, procedure, patient position, and availability of correct equipment. Yes   SITE: Left knee   MEDICATIONS:  2 mLs of 1% Lidocaine with epinephrine   NOTE: The area was prepped with chlorhexidine and draped off in the usual sterile fashion.  The joint was entered with an 18 gauge needle and 50 ml of fluid was withdrawn. The fluid was hemorrhagic. The needle was then withdrawn and a dressing was applied.   COMPLICATIONS: Patient tolerated procedure well, without complication             Jeanette Ball PA-C   Emergency Medicine   [facility] EMERGENCY ROOM       Jeanette Ball PA-C  10/03/23 1193

## 2023-10-04 NOTE — ED PROVIDER NOTES
Emergency Department Midlevel Supervisory Note     I personally saw the patient and performed a substantive portion of the visit including all aspects of the medical decision making.    ED Course:  7:45 PM  Jeanette Ball PA-C staffed patient with me. I agree with their assessment and plan of management, and I will see the patient.  8:00 PM I met with the patient to introduce myself, gather additional history, perform my initial exam, and discuss the plan.   9:30 PM We were able to drain 50mL of blood fluid from left knee, consistent with hemarthrosis.  Pt improved with this.  We did still send fluid for culture/testing. Would not act on cell counts given how contaminated it is with red cells.  Will not start on antibx as low suspicion for infection, but culture sent and will be pending.  Suspect this is all hemarthrosis causing pain/swelling and needs outpatient ortho follow up.    Brief HPI:     Salome Isaacs is a 68 year old female who presents for evaluation of joint swelling and hip pain.    For a more detailed HPI, refer to Jeanette Ball PA-C's note.     I, Leslie Tejeda, am serving as a scribe to document services personally performed by Quinton Barnes DO, based on my observations and the provider's statements to me.   I, Quinton Barnes DO, attest that Leslie Tejeda was acting in a scribe capacity, has observed my performance of the services and has documented them in accordance with my direction.    Brief Physical Exam: /63   Pulse 85   Temp 97.4  F (36.3  C) (Temporal)   Resp 30   Wt 60.8 kg (134 lb)   SpO2 98%   BMI 23.00 kg/m    Constitutional:  Alert, in no acute distress  EYES: Conjunctivae clear  HENT:  Atraumatic, normocephalic  Respiratory:  Respirations even, unlabored, in no acute respiratory distress  Cardiovascular:  Regular rate and rhythm, good peripheral perfusion  GI: Soft, nondistended, nontender, no palpable masses, no rebound, no guarding   Musculoskeletal: Range of  motion major extremities intact.  Left knee with a large, palpable effusion. Pain with movement. No overlying erythema or warmth.   Integument: Warm, Dry, No erythema, No rash.   Neurologic:  Alert & oriented, no focal deficits noted. 2+ DP pulse to the left foot.   Psych: Normal mood and affect     MDM:  Pt seen in conjunction with BRUNILDA Jeanette Ball, here with ongoing left knee pain/swelling for the past several weeks.  Pt has seen a clinic in [place] for this and had knee injections with most recent one early last month, pain ongoing since then. Denies fevers. Pt with palpable effusion.  No known trauma and xrays and US neg. Agree with arthrocentesis, although unlikely infection. Would get benefit from removal of fluid.  Could be hemarthrosis.  Pt has a lot of arthritis on knee xrays.         1. Hemarthrosis    2. Atypical chest pain        Labs and Imaging:  Results for orders placed or performed during the hospital encounter of 10/03/23   XR Knee Left 3 Views    Impression    IMPRESSION: No acute displaced left knee fracture or dislocation is identified. Mild lateral left patellar subluxation with severe patellofemoral compartment knee osteoarthritis with bone-on-bone abutment laterally and fragmented chronic ossicle along   the lateral patella. Joint space narrowing and marginal spurring in the medial and lateral compartments, particularly the lateral compartment. Couple of loose bodies are present particularly in the posterolateral left knee. Sizable left knee joint   effusion with likely loose bodies in the suprapatellar left knee. Anterior knee soft tissue swelling is mild.   US Lower Extremity Venous Duplex Left    Impression    IMPRESSION:  1.  No deep venous thrombosis in the left lower extremity.  2.  Moderate-sized left knee effusion measuring 8.8 x 6.2 x 3.2 cm.   Troponin T, High Sensitivity (now)   Result Value Ref Range    Troponin T, High Sensitivity 10 <=14 ng/L   Basic metabolic panel   Result Value  Ref Range    Sodium 132 (L) 135 - 145 mmol/L    Potassium 3.6 3.4 - 5.3 mmol/L    Chloride 97 (L) 98 - 107 mmol/L    Carbon Dioxide (CO2) 23 22 - 29 mmol/L    Anion Gap 12 7 - 15 mmol/L    Urea Nitrogen 20.8 8.0 - 23.0 mg/dL    Creatinine 0.86 0.51 - 0.95 mg/dL    GFR Estimate 73 >60 mL/min/1.73m2    Calcium 9.0 8.8 - 10.2 mg/dL    Glucose 106 (H) 70 - 99 mg/dL   CBC (+ platelets, no diff)   Result Value Ref Range    WBC Count 7.4 4.0 - 11.0 10e3/uL    RBC Count 3.76 (L) 3.80 - 5.20 10e6/uL    Hemoglobin 12.0 11.7 - 15.7 g/dL    Hematocrit 34.1 (L) 35.0 - 47.0 %    MCV 91 78 - 100 fL    MCH 31.9 26.5 - 33.0 pg    MCHC 35.2 31.5 - 36.5 g/dL    RDW 13.2 10.0 - 15.0 %    Platelet Count 186 150 - 450 10e3/uL   N terminal pro BNP outpatient   Result Value Ref Range    N Terminal Pro BNP Outpatient 44 0 - 900 pg/mL   Crystal ID Synovial Fluid   Result Value Ref Range    Crystals Analysis No clinically significant crystals seen. No clinically significant crystals seen.   Cell Count Body Fluid   Result Value Ref Range    Color Brown (A) Colorless, Yellow    Clarity Turbid (A) Clear    Cell Count Fluid Source Knee, Left     Total Nucleated Cells 1,143 /uL   Differential Body Fluid   Result Value Ref Range    % Neutrophils 9 %    % Lymphocytes 15 %    % Monocyte/Macrophages 76 %   ECG 12-LEAD WITH MUSE (LHE)   Result Value Ref Range    Systolic Blood Pressure  mmHg    Diastolic Blood Pressure  mmHg    Ventricular Rate 108 BPM    Atrial Rate 108 BPM    ND Interval 130 ms    QRS Duration 90 ms     ms    QTc 426 ms    P Axis -11 degrees    R AXIS 10 degrees    T Axis 49 degrees    Interpretation ECG       Sinus tachycardia  Otherwise normal ECG  No previous ECGs available  Confirmed by SEE ED PROVIDER NOTE FOR, ECG INTERPRETATION ([id number]),  Aziza Leger ([id number]) on 10/3/2023 8:43:03 PM       I have reviewed the relevant laboratory and radiology studies    Procedures:  I was present for the key portions of this  procedure: none    Quinton Barnes DO  [facility] EMERGENCY ROOM  [address] [facility] [id number] [phone]       Quinton Barnes MD  10/03/23 [id number]

## 2023-10-06 ENCOUNTER — TRANSFERRED RECORDS (OUTPATIENT)
Dept: HEALTH INFORMATION MANAGEMENT | Facility: CLINIC | Age: 68
End: 2023-10-06
Payer: COMMERCIAL

## 2023-10-08 LAB — BACTERIA SNV CULT: NO GROWTH

## 2023-12-14 ENCOUNTER — LAB REQUISITION (OUTPATIENT)
Dept: LAB | Facility: CLINIC | Age: 68
End: 2023-12-14
Payer: COMMERCIAL

## 2023-12-14 DIAGNOSIS — M17.12 UNILATERAL PRIMARY OSTEOARTHRITIS, LEFT KNEE: ICD-10-CM

## 2023-12-14 LAB
APPEARANCE FLD: ABNORMAL
BASOPHILS NFR FLD MANUAL: 1 %
CELL COUNT BODY FLUID SOURCE: ABNORMAL
COLOR FLD: ABNORMAL
CRYSTALS SNV MICRO: NORMAL
EOSINOPHIL NFR FLD MANUAL: 1 %
GRAM STAIN RESULT: NORMAL
GRAM STAIN RESULT: NORMAL
LYMPHOCYTES NFR FLD MANUAL: 39 %
MONOS+MACROS NFR FLD MANUAL: 4 %
NEUTS BAND NFR FLD MANUAL: 55 %
WBC # FLD AUTO: 2261 /UL

## 2023-12-14 PROCEDURE — 87075 CULTR BACTERIA EXCEPT BLOOD: CPT | Mod: ORL | Performed by: STUDENT IN AN ORGANIZED HEALTH CARE EDUCATION/TRAINING PROGRAM

## 2023-12-14 PROCEDURE — 87070 CULTURE OTHR SPECIMN AEROBIC: CPT | Mod: ORL | Performed by: STUDENT IN AN ORGANIZED HEALTH CARE EDUCATION/TRAINING PROGRAM

## 2023-12-14 PROCEDURE — 87476 LYME DIS DNA AMP PROBE: CPT | Mod: ORL | Performed by: STUDENT IN AN ORGANIZED HEALTH CARE EDUCATION/TRAINING PROGRAM

## 2023-12-14 PROCEDURE — 87102 FUNGUS ISOLATION CULTURE: CPT | Mod: ORL | Performed by: STUDENT IN AN ORGANIZED HEALTH CARE EDUCATION/TRAINING PROGRAM

## 2023-12-14 PROCEDURE — 89051 BODY FLUID CELL COUNT: CPT | Mod: ORL | Performed by: STUDENT IN AN ORGANIZED HEALTH CARE EDUCATION/TRAINING PROGRAM

## 2023-12-14 PROCEDURE — 87205 SMEAR GRAM STAIN: CPT | Mod: ORL | Performed by: STUDENT IN AN ORGANIZED HEALTH CARE EDUCATION/TRAINING PROGRAM

## 2023-12-14 PROCEDURE — 89060 EXAM SYNOVIAL FLUID CRYSTALS: CPT | Mod: ORL | Performed by: STUDENT IN AN ORGANIZED HEALTH CARE EDUCATION/TRAINING PROGRAM

## 2023-12-17 LAB — B BURGDOR DNA SPEC QL NAA+PROBE: NOT DETECTED

## 2023-12-19 LAB — BACTERIA SNV CULT: NO GROWTH

## 2023-12-28 LAB — BACTERIA SNV CULT: NORMAL

## 2024-01-11 LAB — BACTERIA SNV CULT: NO GROWTH
